# Patient Record
Sex: MALE | Race: OTHER | HISPANIC OR LATINO | URBAN - METROPOLITAN AREA
[De-identification: names, ages, dates, MRNs, and addresses within clinical notes are randomized per-mention and may not be internally consistent; named-entity substitution may affect disease eponyms.]

---

## 2023-12-06 ENCOUNTER — EMERGENCY (EMERGENCY)
Facility: HOSPITAL | Age: 43
LOS: 1 days | Discharge: HOME | End: 2023-12-06
Attending: EMERGENCY MEDICINE
Payer: SELF-PAY

## 2023-12-06 VITALS
DIASTOLIC BLOOD PRESSURE: 54 MMHG | OXYGEN SATURATION: 98 % | HEART RATE: 73 BPM | SYSTOLIC BLOOD PRESSURE: 101 MMHG | RESPIRATION RATE: 16 BRPM | WEIGHT: 225.09 LBS | TEMPERATURE: 97 F

## 2023-12-06 PROCEDURE — 96372 THER/PROPH/DIAG INJ SC/IM: CPT

## 2023-12-06 PROCEDURE — 99284 EMERGENCY DEPT VISIT MOD MDM: CPT

## 2023-12-06 PROCEDURE — 99283 EMERGENCY DEPT VISIT LOW MDM: CPT | Mod: 25

## 2023-12-06 RX ORDER — CYCLOBENZAPRINE HYDROCHLORIDE 10 MG/1
1 TABLET, FILM COATED ORAL
Qty: 6 | Refills: 0
Start: 2023-12-06 | End: 2023-12-07

## 2023-12-06 RX ORDER — ACETAMINOPHEN 500 MG
3 TABLET ORAL
Qty: 27 | Refills: 0
Start: 2023-12-06 | End: 2023-12-08

## 2023-12-06 RX ORDER — KETOROLAC TROMETHAMINE 30 MG/ML
30 SYRINGE (ML) INJECTION ONCE
Refills: 0 | Status: DISCONTINUED | OUTPATIENT
Start: 2023-12-06 | End: 2023-12-06

## 2023-12-06 RX ORDER — CYCLOBENZAPRINE HYDROCHLORIDE 10 MG/1
5 TABLET, FILM COATED ORAL ONCE
Refills: 0 | Status: COMPLETED | OUTPATIENT
Start: 2023-12-06 | End: 2023-12-06

## 2023-12-06 RX ORDER — IBUPROFEN 200 MG
1 TABLET ORAL
Qty: 9 | Refills: 0
Start: 2023-12-06 | End: 2023-12-08

## 2023-12-06 RX ORDER — LIDOCAINE 4 G/100G
1 CREAM TOPICAL ONCE
Refills: 0 | Status: COMPLETED | OUTPATIENT
Start: 2023-12-06 | End: 2023-12-06

## 2023-12-06 RX ADMIN — CYCLOBENZAPRINE HYDROCHLORIDE 5 MILLIGRAM(S): 10 TABLET, FILM COATED ORAL at 19:55

## 2023-12-06 RX ADMIN — LIDOCAINE 1 PATCH: 4 CREAM TOPICAL at 19:55

## 2023-12-06 RX ADMIN — Medication 30 MILLIGRAM(S): at 19:56

## 2023-12-06 RX ADMIN — Medication 30 MILLIGRAM(S): at 20:26

## 2023-12-06 NOTE — ED PROVIDER NOTE - NSFOLLOWUPINSTRUCTIONS_ED_ALL_ED_FT
1) Follow up with your doctor as discussed  2) Return to the ED immediately for new or worsening symptoms like chest pain, shortness of breath, numbness, inability to walk  3) Please continue to take any home medications as prescribed  4) We have sent medications to your pharmacy.     1) Itzel un seguimiento con choe médico según lo comentado.  2) Regrese al servicio de urgencias inmediatamente si presenta síntomas nuevos o que empeoran, alia dolor en el pecho, dificultad para respirar, entumecimiento o incapacidad para caminar.  3) Continúe tomando los medicamentos caseros según lo recetado.  4) Hemos enviado medicamentos a choe farmacia.    Dolor de espalda    LO QUE NECESITA SABER:    ¿Qué necesito saber acerca del dolor de espalda?El dolor de espalda es común. Usted puede tener dolor de espalda y espasmos musculares. Usted puede estar adolorido o sentir rigidez en bola o ambos lados de la espalda. El dolor se puede propagar a la parte baja del cuerpo.    ¿Qué aumenta mi riesgo de tener dolor de espalda?    Mainor enfermedad que afecta la columna vertebral, las articulaciones o los músculos, alia la tensión muscular o los problemas de disco    Inclinarse o levantar de mainor forma repetitiva o girar o levantar artículos pesados    Lesión debido a mainor caída o accidente    Falta de actividad física regular    Obesidad o embarazo    Fumar    Envejecimiento    Manejar, estar sentado o de pie por mucho tiempo    Micki postura mientras está sentado o de pie  ¿Cómo se diagnostica choe dolor de espalda?Choe médico le preguntará si tiene alguna condición médica. Él podría preguntarle si tiene antecedentes de dolor de espalda y cómo comenzó. Observará cómo se pone de pie y camina y revisará choe rango de movimiento. Muéstrele dónde siente el dolor y qué lo mejora o lo empeora. Explique el dolor, que tan ursula es y el tiempo que le dura. Dígale si el dolor empeora por las noches o cuando se recuesta boca arriba.    ¿Cuál es el tratamiento para el dolor de espalda?    Medicamentos:  AINEpueden disminuir la inflamación y el dolor o la fiebre. Hermelindo medicamento está disponible con o sin mainor receta médica. Los GENET pueden causar sangrado estomacal o problemas renales en ciertas personas. Si usted najma un medicamento anticoagulante, siempre pregúntele a choe médico si los GENET son seguros para usted. Siempre shirley la etiqueta de hermelindo medicamento y siga las instrucciones.    Acetaminofénalivia el dolor y baja la fiebre. Está disponible sin receta médica. Pregunte la cantidad y la frecuencia con que debe tomarlos. Siga las indicaciones. Shirley las etiquetas de todos los demás medicamentos que esté usando para saber si también contienen acetaminofén, o pregunte a choe médico o farmacéutico. El acetaminofén puede causar daño en el hígado cuando no se najma de forma correcta.    Relajantes muscularesayudan a disminuir los espasmos musculares y el dolor de espalda.    La acupresiónse puede recomendar para disminuir el dolor y mejorar el movimiento. La acupresión es mainor presión o un masaje localizado en la marlene del dolor de espalda.    Mainor unidad de electroestimulación nerviosa transcutánea (TENS)es un dispositivo portátil, tamaño de bolsillo, alimentado por batería, que se adhiere a la piel. Por lo general se coloca sobre el área dolorida. Usa señales eléctricas leves y seguras para ayudar a controlar el dolor.  ¿Cómo puedo controlar el dolor de espalda?    Aplique hieloen la espalda de 15 a 20 minutos cada hora o alia se le indique. Use mainor compresa de hielo o ponga hielo triturado en mainor bolsa de plástico. Cúbralo con mainor toalla antes de aplicarlo sobre cohe piel. El hielo disminuye el dolor y ayuda a evitar el daño en los tejidos.    Aplique caloren la espalda de 20 a 30 minutos cada 2 horas por los días que le indiquen. El calor ayuda a disminuir el dolor y los espasmos musculares.    Manténgase activolo más que pueda sin causar más dolor. El reposo en cama puede empeorar choe dolor de espalda. Evite levantar objetos hasta que ya no tenga dolor.  Joe hispana caminando alia ejercicio      Vaya a terapia físicasegún las indicaciones. Un fisioterapeuta puede enseñarle ejercicios que contribuyan a mejorar choe movimiento y fuerza, y a aliviar el dolor.  Llame al número de emergencias local (911 en los [place]) si:    Usted tiene dolor de espalda intenso con dolor en el pecho.    Usted no puede controlar choe orina ni ubaldo deposiciones intestinales.    El dolor se vuelve tan intenso que lo incapacita para caminar.  ¿Cuándo hector buscar atención inmediata?    Usted tiene dolor, entumecimiento o debilidad en mainor o en ambas piernas.    Usted tiene dolor de espalda intenso, náuseas y vómito.    Usted tiene un dolor de espalda intenso que se propaga a un costado o al área genital.  ¿Cuándo hector llamar a mi médico?    Usted tiene dolor de espalda que no mejora con el reposo, ni con el medicamento para el dolor.    Tiene fiebre.    Usted tiene un dolor que empeora cuando está acostado boca arriba o al descansar.    Usted tiene dolor que empeora cuando tose o estornuda.    Usted pierde peso sin proponérselo.    Usted tiene preguntas o inquietudes acerca de choe condición o cuidado.  ACUERDOS SOBRE CHOE CUIDADO:    Usted tiene el derecho de ayudar a planear choe cuidado. Aprenda todo lo que pueda sobre choe condición y alia darle tratamiento. Discuta ubaldo opciones de tratamiento con ubaldo médicos para decidir el cuidado que usted desea recibir. Usted siempre tiene el derecho de rechazar el tratamiento. 1) Follow up with your doctor as discussed  2) Return to the ED immediately for new or worsening symptoms like chest pain, shortness of breath, numbness, inability to walk  3) Please continue to take any home medications as prescribed  4) We have sent medications to your pharmacy.     1) Itzel un seguimiento con choe médico según lo comentado.  2) Regrese al servicio de urgencias inmediatamente si presenta síntomas nuevos o que empeoran, alia dolor en el pecho, dificultad para respirar, entumecimiento o incapacidad para caminar.  3) Continúe tomando los medicamentos caseros según lo recetado.  4) Hemos enviado medicamentos a choe farmacia.    Dolor de espalda    LO QUE NECESITA SABER:    ¿Qué necesito saber acerca del dolor de espalda?El dolor de espalda es común. Usted puede tener dolor de espalda y espasmos musculares. Usted puede estar adolorido o sentir rigidez en bola o ambos lados de la espalda. El dolor se puede propagar a la parte baja del cuerpo.    ¿Qué aumenta mi riesgo de tener dolor de espalda?    Mainor enfermedad que afecta la columna vertebral, las articulaciones o los músculos, alia la tensión muscular o los problemas de disco    Inclinarse o levantar de mainor forma repetitiva o girar o levantar artículos pesados    Lesión debido a mainor caída o accidente    Falta de actividad física regular    Obesidad o embarazo    Fumar    Envejecimiento    Manejar, estar sentado o de pie por mucho tiempo    Micki postura mientras está sentado o de pie  ¿Cómo se diagnostica choe dolor de espalda?Choe médico le preguntará si tiene alguna condición médica. Él podría preguntarle si tiene antecedentes de dolor de espalda y cómo comenzó. Observará cómo se pone de pie y camina y revisará choe rango de movimiento. Muéstrele dónde siente el dolor y qué lo mejora o lo empeora. Explique el dolor, que tan ursula es y el tiempo que le dura. Dígale si el dolor empeora por las noches o cuando se recuesta boca arriba.    ¿Cuál es el tratamiento para el dolor de espalda?    Medicamentos:  AINEpueden disminuir la inflamación y el dolor o la fiebre. Hermelindo medicamento está disponible con o sin mainor receta médica. Los GENET pueden causar sangrado estomacal o problemas renales en ciertas personas. Si usted najma un medicamento anticoagulante, siempre pregúntele a choe médico si los GENET son seguros para usted. Siempre shirley la etiqueta de hermelindo medicamento y siga las instrucciones.    Acetaminofénalivia el dolor y baja la fiebre. Está disponible sin receta médica. Pregunte la cantidad y la frecuencia con que debe tomarlos. Siga las indicaciones. Shirley las etiquetas de todos los demás medicamentos que esté usando para saber si también contienen acetaminofén, o pregunte a choe médico o farmacéutico. El acetaminofén puede causar daño en el hígado cuando no se najma de forma correcta.    Relajantes muscularesayudan a disminuir los espasmos musculares y el dolor de espalda.    La acupresiónse puede recomendar para disminuir el dolor y mejorar el movimiento. La acupresión es mainor presión o un masaje localizado en la marlene del dolor de espalda.    Mainor unidad de electroestimulación nerviosa transcutánea (TENS)es un dispositivo portátil, tamaño de bolsillo, alimentado por batería, que se adhiere a la piel. Por lo general se coloca sobre el área dolorida. Usa señales eléctricas leves y seguras para ayudar a controlar el dolor.  ¿Cómo puedo controlar el dolor de espalda?    Aplique hieloen la espalda de 15 a 20 minutos cada hora o alia se le indique. Use mainor compresa de hielo o ponga hielo triturado en mainor bolsa de plástico. Cúbralo con mainor toalla antes de aplicarlo sobre choe piel. El hielo disminuye el dolor y ayuda a evitar el daño en los tejidos.    Aplique caloren la espalda de 20 a 30 minutos cada 2 horas por los días que le indiquen. El calor ayuda a disminuir el dolor y los espasmos musculares.    Manténgase activolo más que pueda sin causar más dolor. El reposo en cama puede empeorar choe dolor de espalda. Evite levantar objetos hasta que ya no tenga dolor.  Joe hispana caminando alia ejercicio      Vaya a terapia físicasegún las indicaciones. Un fisioterapeuta puede enseñarle ejercicios que contribuyan a mejorar choe movimiento y fuerza, y a aliviar el dolor.  Llame al número de emergencias local (911 en [place]) si:    Usted tiene dolor de espalda intenso con dolor en el pecho.    Usted no puede controlar choe orina ni ubaldo deposiciones intestinales.    El dolor se vuelve tan intenso que lo incapacita para caminar.  ¿Cuándo hector buscar atención inmediata?    Usted tiene dolor, entumecimiento o debilidad en mainor o en ambas piernas.    Usted tiene dolor de espalda intenso, náuseas y vómito.    Usted tiene un dolor de espalda intenso que se propaga a un costado o al área genital.  ¿Cuándo hector llamar a mi médico?    Usted tiene dolor de espalda que no mejora con el reposo, ni con el medicamento para el dolor.    Tiene fiebre.    Usted tiene un dolor que empeora cuando está acostado boca arriba o al descansar.    Usted tiene dolor que empeora cuando tose o estornuda.    Usted pierde peso sin proponérselo.    Usted tiene preguntas o inquietudes acerca de choe condición o cuidado.  ACUERDOS SOBRE CHOE CUIDADO:    Usted tiene el derecho de ayudar a planear choe cuidado. Aprenda todo lo que pueda sobre choe condición y alia darle tratamiento. Discuta ubaldo opciones de tratamiento con ubaldo médicos para decidir el cuidado que usted desea recibir. Usted siempre tiene el derecho de rechazar el tratamiento.

## 2023-12-06 NOTE — ED PROVIDER NOTE - PROGRESS NOTE DETAILS
pt states mild improvement in back pain, seen ambulating to restroom with steady gait and sleeping in chair. Discussed strict return precautions and red flags signs through  ID [id number]. pt verbalized understanding, all questions answered pt states mild improvement in back pain, seen ambulating to restroom with steady gait and sleeping in chair. Discussed strict return precautions and red flags signs through  ID [id number]. pt verbalized understanding, all questions answered
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
## 2023-12-06 NOTE — ED PROVIDER NOTE - PHYSICAL EXAMINATION
Gen: NAD, AOx3, able to make needs known, non-toxic  Head: NCAT  HEENT: EOMI, oral mucosa moist, normal conjunctiva  Lung: CTAB, no respiratory distress, no wheezes/rhonchi/rales B/L, speaking in full sentences  CV: RRR, no murmurs  Abd: non distended, soft, nontender, no guarding, no CVA tenderness  MSK: no visible deformities, no midline spinal ttp, FROM upper and lower extremities, 5/5 strength in all 4 extremities, sensation intact  Neuro: Appears non focal  Skin: Warm, well perfused, no rash  Psych: normal affect
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
## 2023-12-06 NOTE — ED ADULT NURSE NOTE - NSFALLUNIVINTERV_ED_ALL_ED
Bed/Stretcher in lowest position, wheels locked, appropriate side rails in place/Call bell, personal items and telephone in reach/Instruct patient to call for assistance before getting out of bed/chair/stretcher/Non-slip footwear applied when patient is off stretcher/[place] to call system/Physically safe environment - no spills, clutter or unnecessary equipment/Purposeful proactive rounding/Room/bathroom lighting operational, light cord in reach Bed/Stretcher in lowest position, wheels locked, appropriate side rails in place/Call bell, personal items and telephone in reach/Instruct patient to call for assistance before getting out of bed/chair/stretcher/Non-slip footwear applied when patient is off stretcher/[place] to call system/Physically safe environment - no spills, clutter or unnecessary equipment/Purposeful proactive rounding/Room/bathroom lighting operational, light cord in reach

## 2023-12-06 NOTE — ED PROVIDER NOTE - NS ED ATTENDING STATEMENT MOD
I have seen and examined this patient and fully participated in the care of this patient as the teaching attending.  The service was shared with the SARAH.  I reviewed and verified the documentation and independently performed the documented:

## 2023-12-06 NOTE — ED PROVIDER NOTE - PATIENT PORTAL LINK FT
You can access the FollowMyHealth Patient Portal offered by [facility] by registering at the following website: [email or web address]. By joining [employer]’s FollowMyHealth portal, you will also be able to view your health information using other applications (apps) compatible with our system. You can access the FollowMyHealth Patient Portal offered by [facility] by registering at the following website: [email or web address]. By joining [employer]’s FollowMyHealth portal, you will also be able to view your health information using other applications (apps) compatible with our system.